# Patient Record
Sex: MALE | Race: ASIAN | NOT HISPANIC OR LATINO | Employment: OTHER | URBAN - METROPOLITAN AREA
[De-identification: names, ages, dates, MRNs, and addresses within clinical notes are randomized per-mention and may not be internally consistent; named-entity substitution may affect disease eponyms.]

---

## 2017-02-06 ENCOUNTER — AMBULATORY - HEALTHEAST (OUTPATIENT)
Dept: CARDIOLOGY | Facility: CLINIC | Age: 62
End: 2017-02-06

## 2017-02-06 ENCOUNTER — OFFICE VISIT - HEALTHEAST (OUTPATIENT)
Dept: CARDIOLOGY | Facility: CLINIC | Age: 62
End: 2017-02-06

## 2017-02-06 DIAGNOSIS — I10 ESSENTIAL HYPERTENSION: ICD-10-CM

## 2017-02-06 DIAGNOSIS — I25.10 CORONARY ARTERY DISEASE INVOLVING NATIVE CORONARY ARTERY OF NATIVE HEART WITHOUT ANGINA PECTORIS: ICD-10-CM

## 2017-02-06 DIAGNOSIS — E78.5 DYSLIPIDEMIA: ICD-10-CM

## 2017-02-06 ASSESSMENT — MIFFLIN-ST. JEOR: SCORE: 1405.42

## 2017-03-28 ENCOUNTER — COMMUNICATION - HEALTHEAST (OUTPATIENT)
Dept: CARDIOLOGY | Facility: CLINIC | Age: 62
End: 2017-03-28

## 2017-03-28 DIAGNOSIS — I25.10 CORONARY ATHEROSCLEROSIS: ICD-10-CM

## 2017-10-02 ENCOUNTER — COMMUNICATION - HEALTHEAST (OUTPATIENT)
Dept: CARDIOLOGY | Facility: CLINIC | Age: 62
End: 2017-10-02

## 2017-10-02 DIAGNOSIS — I10 HTN (HYPERTENSION): ICD-10-CM

## 2018-02-28 ENCOUNTER — COMMUNICATION - HEALTHEAST (OUTPATIENT)
Dept: CARDIOLOGY | Facility: CLINIC | Age: 63
End: 2018-02-28

## 2018-02-28 DIAGNOSIS — I10 HTN (HYPERTENSION): ICD-10-CM

## 2018-02-28 RX ORDER — METOPROLOL TARTRATE 50 MG
50 TABLET ORAL 2 TIMES DAILY
Qty: 180 TABLET | Refills: 0 | Status: SHIPPED | OUTPATIENT
Start: 2018-02-28

## 2018-04-06 ENCOUNTER — OFFICE VISIT - HEALTHEAST (OUTPATIENT)
Dept: CARDIOLOGY | Facility: CLINIC | Age: 63
End: 2018-04-06

## 2018-04-06 ENCOUNTER — COMMUNICATION - HEALTHEAST (OUTPATIENT)
Dept: CARDIOLOGY | Facility: CLINIC | Age: 63
End: 2018-04-06

## 2018-04-06 DIAGNOSIS — I10 HTN (HYPERTENSION): ICD-10-CM

## 2018-04-06 DIAGNOSIS — I10 ESSENTIAL HYPERTENSION: ICD-10-CM

## 2018-04-06 DIAGNOSIS — E78.5 DYSLIPIDEMIA: ICD-10-CM

## 2018-04-06 DIAGNOSIS — I25.10 CORONARY ATHEROSCLEROSIS: ICD-10-CM

## 2018-04-06 DIAGNOSIS — I25.10 CORONARY ARTERY DISEASE INVOLVING NATIVE CORONARY ARTERY OF NATIVE HEART WITHOUT ANGINA PECTORIS: ICD-10-CM

## 2018-04-06 RX ORDER — LISINOPRIL 40 MG/1
40 TABLET ORAL DAILY
Qty: 90 TABLET | Refills: 3 | Status: SHIPPED | OUTPATIENT
Start: 2018-04-06

## 2018-04-06 RX ORDER — METOPROLOL TARTRATE 50 MG
TABLET ORAL
Qty: 180 TABLET | Refills: 3 | Status: SHIPPED | OUTPATIENT
Start: 2018-04-06

## 2018-04-06 RX ORDER — NITROGLYCERIN 0.4 MG/1
0.4 TABLET SUBLINGUAL
Status: SHIPPED | COMMUNITY
Start: 2017-12-20

## 2018-04-06 ASSESSMENT — MIFFLIN-ST. JEOR: SCORE: 1419.03

## 2019-02-08 ENCOUNTER — COMMUNICATION - HEALTHEAST (OUTPATIENT)
Dept: ADMINISTRATIVE | Facility: CLINIC | Age: 64
End: 2019-02-08

## 2019-05-02 ENCOUNTER — OFFICE VISIT - HEALTHEAST (OUTPATIENT)
Dept: CARDIOLOGY | Facility: CLINIC | Age: 64
End: 2019-05-02

## 2019-05-02 DIAGNOSIS — I10 ESSENTIAL HYPERTENSION: ICD-10-CM

## 2019-05-02 DIAGNOSIS — E78.5 DYSLIPIDEMIA: ICD-10-CM

## 2019-05-02 DIAGNOSIS — I25.10 CORONARY ARTERY DISEASE INVOLVING NATIVE CORONARY ARTERY OF NATIVE HEART WITHOUT ANGINA PECTORIS: ICD-10-CM

## 2019-05-02 ASSESSMENT — MIFFLIN-ST. JEOR: SCORE: 1409.96

## 2021-05-30 VITALS — HEIGHT: 63 IN | WEIGHT: 161 LBS | BODY MASS INDEX: 28.53 KG/M2

## 2021-06-01 VITALS — WEIGHT: 164 LBS | BODY MASS INDEX: 29.06 KG/M2 | HEIGHT: 63 IN

## 2021-06-03 VITALS — WEIGHT: 162 LBS | BODY MASS INDEX: 28.7 KG/M2 | HEIGHT: 63 IN

## 2021-06-18 NOTE — LETTER
Letter by Lauro Torres MD at      Author: Lauro Torres MD Service: -- Author Type: --    Filed:  Encounter Date: 2/8/2019 Status: (Other)       Susie Mcconnell  [address] [place] [id number]      February 8, 2019      Dear Susie,    This letter is to remind you that you will be due for your follow up appointment with Dr. Juan M Torres . To help ensure you are in the best health possible, a regular follow-up with your cardiologist is essential.     Please call our Patient Scheduling Line at [phone] to schedule your appointment at your earliest convenience.  If you have recently scheduled an appointment, please disregard this letter.    We look forward to seeing you again. As always, we are available at the number  above for any questions or concerns you may have.      Sincerely,     The Physicians and Staff of [facility]

## 2021-06-25 NOTE — PROGRESS NOTES
Progress Notes by Lauro Torres MD at 2/6/2017 12:50 PM     Author: Lauro Torres MD Service: -- Author Type: Physician    Filed: 2/6/2017  1:15 PM Encounter Date: 2/6/2017 Status: Signed    : Lauro Torres MD (Physician)                  [facility]/Heart           Thank you Dr. Hamilton for asking the [facility] team to participate in the care of your patient, Susie Mcconnell.     Impression and Plan     1. Coronary artery disease. Rylie has known coronary artery disease. Specifically, patient had presented with an unstable ischemic syndrome early March 2015. Patient underwent angiography 4 March 2015 and was found to have significant disease involving the proximal and distal right coronary artery with both lesions treated successfully with Promus drug-eluting stents with a 3.5×16 mm Promise Premier drug-eluting stent placed to the proximal right coronary artery and a 2.75×12 mm Promise Premier drug-eluting stent placed to the more distal right coronary artery without complication.    This is stable since his revascularization procedure. He denies any recurrent chest discomfort.  At this time, I indicated to [facility] that he could discontinue the clopidogrel though emphasize the importance of continuing daily aspirin.     2. Hypertension. Blood pressure fairly reasonable my office today at 120/50 mmHg.     3. Dyslipidemia. Lipid profile within the last year on 12 September 2016 was favorable with LDL 74 mg/dL and HDL 40 mg/dL.     Follow-up in one year.            History of Present Illness    Once again I would like to thank you again for asking me to participate in the care of your patient, Susie Mcconnell.  As you know, but to reiterate for my own records, Susie Mcconnell is a 61 y.o. male with known coronary artery disease. Specifically, patient had presented with an unstable ischemic syndrome early March 2015. Patient underwent angiography 4 March 2015 and  "was found to have significant disease involving the proximal and distal right coronary artery with both lesions treated successfully with Promus drug-eluting stents with a 3.5×16 mm Promise Premier drug-eluting stent placed to the proximal right coronary artery and a 2.75×12 mm Promise Premier drug-eluting stent placed to the more distal right coronary artery without complication.     In follow-up today, Rylie is without complaint.  He specifically denies chest pain or shortness of breath.  He reports no subjective decline in exercise tolerance.  He reports no palpitations or lightheadedness.    Further review of systems is otherwise negative/noncontributory (medical record and 13 point review of systems reviewed as well and pertinent positives noted).         Cardiac Diagnostics      Coronary angiography 4 March 2015:  1. Left Main coronary artery: Minimal disease  2. LAD: Minimal disease  3. Left circumflex: Moderate 50% stenosis in the mid vessel.  4. Right coronary artery with severe 80% proximal right coronary artery stenosis and 90% distal right coronary artery stenosis.  5. Both right coronary artery lesions treated successfully with Promus drug-eluting stents with a 3.5×16 mm Promise Premier drug-eluting stent placed to the proximal right coronary artery and a 2.75×12 mm Promise Premier drug-eluting stent placed to the more distal right coronary artery without complication.     Echocardiogram 3 March 2015:  1. Normal left ventricular size and systolic performance with ejection fraction of 50-55%.  2. No regional wall motion abnormalities.  3. No significant valvular heart disease.          Physical Examination         Visit Vitals   ? /66 (Patient Site: Left Arm, Patient Position: Sitting, Cuff Size: Adult Regular)   ? Pulse 65   ? Resp 16   ? Ht 5' 3\" (1.6 m)   ? Wt 161 lb (73 kg)   ? SpO2 99%   ? BMI 28.52 kg/m2           Wt Readings from Last 3 Encounters:   02/06/17 161 lb (73 kg)   12/15/15 " 158 lb (71.7 kg)   06/18/15 164 lb (74.4 kg)       The patient is alert and oriented times three. Sclerae are anicteric. Mucosal membranes are moist. Jugular venous pressure is normal. No significant adenopathy/thyromegally appreciated. Lungs are clear with good expansion. On cardiovascular exam, the patient has a regular S1 and S2. Abdomen is soft and non-tender. Extremities reveal no clubbing, cyanosis, or edema.         Family History/Social History/Risk Factors   Patient does not smoke.  Family history of hypertension.         Medications  Allergies   Current Outpatient Prescriptions   Medication Sig Dispense Refill   ? aspirin 81 MG EC tablet Take 81 mg by mouth daily.     ? atorvastatin (LIPITOR) 80 MG tablet Take 1 tablet (80 mg total) by mouth bedtime. 90 tablet 2   ? calcium, as carbonate, (TUMS) 200 mg calcium (500 mg) chewable tablet Chew 1 tablet daily.     ? lisinopril (PRINIVIL,ZESTRIL) 40 MG tablet Take 1 tablet (40 mg total) by mouth daily. 90 tablet 3   ? metoprolol tartrate (LOPRESSOR) 50 MG tablet Take 1 tablet (50 mg total) by mouth 2 (two) times a day. 180 tablet 2   ? metoprolol tartrate (LOPRESSOR) 50 MG tablet TAKE ONE TABLET BY MOUTH TWICE DAILY 180 tablet 0   ? nitroglycerin (NITROSTAT) 0.4 MG SL tablet Place 0.4 mg under the tongue every 5 (five) minutes as needed for chest pain.     ? OMEGA-3/DHA/EPA/FISH OIL (FISH OIL-OMEGA-3 FATTY ACIDS) 300-1,000 mg capsule Take 2 g by mouth daily.       No current facility-administered medications for this visit.       Allergies   Allergen Reactions   ? Penicillins Rash          Lab Results   Lab Results   Component Value Date     03/06/2015    K 4.4 03/06/2015     (H) 03/06/2015    CO2 21 (L) 03/06/2015    BUN 15 03/06/2015    CREATININE 0.99 03/06/2015    CALCIUM 9.0 03/06/2015     Lab Results   Component Value Date    WBC 5.0 03/04/2015    WBC 5.1 03/03/2015    HGB 12.5 (L) 03/06/2015    HCT 37.1 (L) 03/04/2015    MCV 74 (L) 03/04/2015      03/04/2015     03/04/2015     Lab Results   Component Value Date    CHOL 121 07/29/2015    TRIG 51 07/29/2015    HDL 43 07/29/2015    LDLCALC 68 07/29/2015     Lab Results   Component Value Date    INR 1.05 03/02/2015     Lab Results   Component Value Date    CKTOTAL 149 03/02/2015    CKMB 3 03/02/2015    CKMB 2 03/02/2015    TROPONINI 0.75 () 03/03/2015    TROPONINI 1.06 () 03/03/2015    TROPONINI 0.46 () 03/02/2015

## 2021-06-26 NOTE — PROGRESS NOTES
Progress Notes by Lauro Torres MD at 4/6/2018 11:10 AM     Author: Lauro Torres MD Service: -- Author Type: Physician    Filed: 4/6/2018 11:26 AM Encounter Date: 4/6/2018 Status: Signed    : Lauro Torres MD (Physician)                  [facility]/Heart  [phone]         Thank you Dr. Hamilton for asking the [facility] team to participate in the care of your patient, Susie Mcconnell.     Impression and Plan     1. Coronary artery disease. Rylie has known coronary artery disease. Specifically, patient had presented with an unstable ischemic syndrome early March 2015. Patient underwent angiography 4 March 2015 and was found to have significant disease involving the proximal and distal right coronary artery with both lesions treated successfully with Promus drug-eluting stents with a 3.5×16 mm Promise Premier drug-eluting stent placed to the proximal right coronary artery and a 2.75×12 mm Promise Premier drug-eluting stent placed to the more distal right coronary artery without complication.     This is stable since his revascularization procedure. He denies any recurrent chest discomfort.     2. Hypertension. Blood pressure fairly reasonable my office today at 120/64 mmHg.     3. Dyslipidemia.  Recent lipid profile performed at [facility] 20 December 2017 was favorable with LDL 71 mg/dL and HDL 50 mg/dL.     Follow-up in one year.           History of Present Illness    Once again I would like to thank you again for asking me to participate in the care of your patient, Susie Mcconnell.  As you know, but to reiterate for my own records, Susie Mcconnell is a 62 y.o. male with known coronary artery disease. Specifically, patient had presented with an unstable ischemic syndrome early March 2015. Patient underwent angiography 4 March 2015 and was found to have significant disease involving the proximal and distal right coronary artery with both lesions treated  "successfully with Promus drug-eluting stents with a 3.5×16 mm Promise Premier drug-eluting stent placed to the proximal right coronary artery and a 2.75×12 mm Promise Premier drug-eluting stent placed to the more distal right coronary artery without complication.     In follow-up today, Rylie is without complaint.  He specifically denies chest pain or shortness of breath.  He reports no subjective decline in exercise tolerance.  He reports no palpitations or lightheadedness    Further review of systems is otherwise negative/noncontributory (medical record and 13 point review of systems reviewed as well and pertinent positives noted).         Cardiac Diagnostics      Coronary angiography 4 March 2015:  1. Left Main coronary artery: Minimal disease  2. LAD: Minimal disease  3. Left circumflex: Moderate 50% stenosis in the mid vessel.  4. Right coronary artery with severe 80% proximal right coronary artery stenosis and 90% distal right coronary artery stenosis.  5. Both right coronary artery lesions treated successfully with Promus drug-eluting stents with a 3.5×16 mm Promise Premier drug-eluting stent placed to the proximal right coronary artery and a 2.75×12 mm Promise Premier drug-eluting stent placed to the more distal right coronary artery without complication.    Echocardiogram 3 March 2015:  1. Normal left ventricular size and systolic performance with ejection fraction of 50-55%.  2. No regional wall motion abnormalities.  3. No significant valvular heart disease.         Physical Examination       /64  Pulse 60  Resp 16  Ht 5' 3\" (1.6 m)  Wt 164 lb (74.4 kg)  BMI 29.05 kg/m2        Wt Readings from Last 3 Encounters:   04/06/18 164 lb (74.4 kg)   02/06/17 161 lb (73 kg)   12/15/15 158 lb (71.7 kg)       The patient is alert and oriented times three. Sclerae are anicteric. Mucosal membranes are moist. Jugular venous pressure is normal. No significant adenopathy/thyromegally appreciated. Lungs are " clear with good expansion. On cardiovascular exam, the patient has a regular S1 and S2. Abdomen is soft and non-tender. Extremities reveal no clubbing, cyanosis, or edema.           Family History/Social History/Risk Factors   Patient does not smoke.  Family history of hypertension.          Medications  Allergies   Current Outpatient Prescriptions   Medication Sig Dispense Refill   ? nitroglycerin (NITROSTAT) 0.4 MG SL tablet Place 0.4 mg under the tongue.     ? aspirin 81 MG EC tablet Take 81 mg by mouth daily.     ? atorvastatin (LIPITOR) 80 MG tablet Take 1 tablet (80 mg total) by mouth bedtime. 90 tablet 2   ? calcium, as carbonate, (TUMS) 200 mg calcium (500 mg) chewable tablet Chew 1 tablet daily.     ? lisinopril (PRINIVIL,ZESTRIL) 40 MG tablet TAKE ONE TABLET BY MOUTH ONCE DAILY 90 tablet 2   ? metoprolol tartrate (LOPRESSOR) 50 MG tablet Take 1 tablet (50 mg total) by mouth 2 (two) times a day. 180 tablet 0   ? nitroglycerin (NITROSTAT) 0.4 MG SL tablet Place 0.4 mg under the tongue every 5 (five) minutes as needed for chest pain.     ? OMEGA-3/DHA/EPA/FISH OIL (FISH OIL-OMEGA-3 FATTY ACIDS) 300-1,000 mg capsule Take 2 g by mouth daily.       No current facility-administered medications for this visit.       Allergies   Allergen Reactions   ? Penicillins Rash          Lab Results   Lab Results   Component Value Date     03/06/2015    K 4.4 03/06/2015     (H) 03/06/2015    CO2 21 (L) 03/06/2015    BUN 15 03/06/2015    CREATININE 0.99 03/06/2015    CALCIUM 9.0 03/06/2015     Lab Results   Component Value Date    WBC 5.0 03/04/2015    WBC 5.1 03/03/2015    HGB 12.5 (L) 03/06/2015    HCT 37.1 (L) 03/04/2015    MCV 74 (L) 03/04/2015     03/04/2015     03/04/2015     Lab Results   Component Value Date    CHOL 121 07/29/2015    TRIG 51 07/29/2015    HDL 43 07/29/2015    LDLCALC 68 07/29/2015     Lab Results   Component Value Date    INR 1.05 03/02/2015     No results found for: BNP  Lab  Results   Component Value Date    CKTOTAL 149 03/02/2015    CKMB 3 03/02/2015    CKMB 2 03/02/2015    TROPONINI 0.75 () 03/03/2015    TROPONINI 1.06 () 03/03/2015    TROPONINI 0.46 () 03/02/2015

## 2021-06-27 NOTE — PROGRESS NOTES
Progress Notes by Lauro Torres MD at 5/2/2019  8:30 AM     Author: Lauro Torres MD Service: -- Author Type: Physician    Filed: 5/2/2019  8:45 AM Encounter Date: 5/2/2019 Status: Signed    : Lauro Torres MD (Physician)                  [facility]/Heart  [phone]         Thank you Dr. Hamilton for asking the [facility] Heart Care team to participate in the care of your patient, Susie Mcconnell.     Impression and Plan     1. Coronary artery disease. Rylie has known coronary artery disease. Specifically, patient had presented with an unstable ischemic syndrome early March 2015. Patient underwent angiography 4 March 2015 and was found to have significant disease involving the proximal and distal right coronary artery with both lesions treated successfully with Promus drug-eluting stents with a 3.5×16 mm Promise Premier drug-eluting stent placed to the proximal right coronary artery and a 2.75×12 mm Promise Premier drug-eluting stent placed to the more distal right coronary artery without complication.     This is stable since his revascularization procedure. He denies any recurrent chest discomfort.     2. Hypertension. Blood pressure is quite reasonable in the office today at 122/62 mmHg.     3. Dyslipidemia.  Lipid profile 18 April 2019 was favorable with LDL 63 mg/dL and HDL 47 mg/dL.     Follow-up in one year.         History of Present Illness    Once again I would like to thank you again for asking me to participate in the care of your patient, Susie Mcconnell.  As you know, but to reiterate for my own records, Susie Mcconnell is a 63 y.o. male with known coronary artery disease. Specifically, patient had presented with an unstable ischemic syndrome early March 2015. Patient underwent angiography 4 March 2015 and was found to have significant disease involving the proximal and distal right coronary artery with both lesions treated successfully with Promus  "drug-eluting stents with a 3.5×16 mm Promise Premier drug-eluting stent placed to the proximal right coronary artery and a 2.75×12 mm Promise Premier drug-eluting stent placed to the more distal right coronary artery without complication.     In follow-up today, Rylie is without complaint.  He specifically denies chest pain or shortness of breath.  He reports no subjective decline in exercise tolerance.  He reports no palpitations or lightheadedness.    Further review of systems is otherwise negative/noncontributory (medical record and 13 point review of systems reviewed as well and pertinent positives noted).         Cardiac Diagnostics      Coronary angiography 4 March 2015:  1. Left Main coronary artery: Minimal disease  2. LAD: Minimal disease  3. Left circumflex: Moderate 50% stenosis in the mid vessel.  4. Right coronary artery with severe 80% proximal right coronary artery stenosis and 90% distal right coronary artery stenosis.  5. Both right coronary artery lesions treated successfully with Promus drug-eluting stents with a 3.5×16 mm Promise Premier drug-eluting stent placed to the proximal right coronary artery and a 2.75×12 mm Promise Premier drug-eluting stent placed to the more distal right coronary artery without complication.    Echocardiogram 3 March 2015:  1. Normal left ventricular size and systolic performance with ejection fraction of 50-55%.  2. No regional wall motion abnormalities.  3. No significant valvular heart disease.           Physical Examination       /62 (Patient Site: Right Arm, Patient Position: Sitting, Cuff Size: Adult Large)   Pulse 68   Resp 16   Ht 5' 3\" (1.6 m)   Wt 162 lb (73.5 kg)   BMI 28.70 kg/m          Wt Readings from Last 3 Encounters:   05/02/19 162 lb (73.5 kg)   04/06/18 164 lb (74.4 kg)   02/06/17 161 lb (73 kg)     The patient is alert and oriented times three. Sclerae are anicteric. Mucosal membranes are moist. Jugular venous pressure is normal. No " significant adenopathy/thyromegally appreciated. Lungs are clear with good expansion. On cardiovascular exam, the patient has a regular S1 and S2. Abdomen is soft and non-tender. Extremities reveal no clubbing, cyanosis, or edema.         Family History/Social History/Risk Factors   Patient does not smoke.  Family history of hypertension.         Medications  Allergies   Current Outpatient Medications   Medication Sig Dispense Refill   ? aspirin 81 MG EC tablet Take 81 mg by mouth daily.     ? atorvastatin (LIPITOR) 80 MG tablet Take 1 tablet (80 mg total) by mouth bedtime. 90 tablet 2   ? lisinopril (PRINIVIL,ZESTRIL) 40 MG tablet Take 1 tablet (40 mg total) by mouth daily. 90 tablet 3   ? metoprolol tartrate (LOPRESSOR) 50 MG tablet Take 1 tablet (50 mg total) by mouth 2 (two) times a day. 180 tablet 0   ? metoprolol tartrate (LOPRESSOR) 50 MG tablet TAKE ONE TABLET BY MOUTH TWICE DAILY 180 tablet 3   ? nitroglycerin (NITROSTAT) 0.4 MG SL tablet Place 0.4 mg under the tongue every 5 (five) minutes as needed for chest pain.     ? nitroglycerin (NITROSTAT) 0.4 MG SL tablet Place 0.4 mg under the tongue.     ? calcium, as carbonate, (TUMS) 200 mg calcium (500 mg) chewable tablet Chew 1 tablet daily.     ? OMEGA-3/DHA/EPA/FISH OIL (FISH OIL-OMEGA-3 FATTY ACIDS) 300-1,000 mg capsule Take 2 g by mouth daily.       No current facility-administered medications for this visit.       Allergies   Allergen Reactions   ? Penicillins Rash          Lab Results   Lab Results   Component Value Date     03/06/2015    K 4.4 03/06/2015     (H) 03/06/2015    CO2 21 (L) 03/06/2015    BUN 15 03/06/2015    CREATININE 0.99 03/06/2015    CALCIUM 9.0 03/06/2015     Lab Results   Component Value Date    WBC 5.0 03/04/2015    WBC 5.1 03/03/2015    HGB 12.5 (L) 03/06/2015    HCT 37.1 (L) 03/04/2015    MCV 74 (L) 03/04/2015     03/04/2015     03/04/2015     Lab Results   Component Value Date    CHOL 121 07/29/2015     TRIG 51 07/29/2015    HDL 43 07/29/2015    LDLCALC 68 07/29/2015     Lab Results   Component Value Date    INR 1.05 03/02/2015     Lab Results   Component Value Date    CKTOTAL 149 03/02/2015    CKMB 3 03/02/2015    CKMB 2 03/02/2015    TROPONINI 0.75 () 03/03/2015    TROPONINI 1.06 () 03/03/2015    TROPONINI 0.46 () 03/02/2015

## 2021-07-30 ENCOUNTER — HOSPITAL ENCOUNTER (OUTPATIENT)
Dept: ULTRASOUND IMAGING | Facility: HOSPITAL | Age: 66
Discharge: HOME | End: 2021-07-30
Attending: INTERNAL MEDICINE | Admitting: INTERNAL MEDICINE
Payer: COMMERCIAL

## 2021-07-30 DIAGNOSIS — N18.30 CHRONIC KIDNEY DISEASE, STAGE III (MODERATE) (H): ICD-10-CM

## 2021-07-30 PROCEDURE — 76770 US EXAM ABDO BACK WALL COMP: CPT

## 2021-10-14 ENCOUNTER — OFFICE VISIT (OUTPATIENT)
Dept: CARDIOLOGY | Facility: CLINIC | Age: 66
End: 2021-10-14
Payer: COMMERCIAL

## 2021-10-14 VITALS
SYSTOLIC BLOOD PRESSURE: 128 MMHG | DIASTOLIC BLOOD PRESSURE: 72 MMHG | RESPIRATION RATE: 16 BRPM | HEART RATE: 74 BPM | WEIGHT: 160 LBS | BODY MASS INDEX: 28.34 KG/M2

## 2021-10-14 DIAGNOSIS — I10 HYPERTENSION, UNSPECIFIED TYPE: ICD-10-CM

## 2021-10-14 DIAGNOSIS — E78.5 DYSLIPIDEMIA: ICD-10-CM

## 2021-10-14 DIAGNOSIS — I25.10 CORONARY ARTERY DISEASE INVOLVING NATIVE CORONARY ARTERY WITHOUT ANGINA PECTORIS, UNSPECIFIED WHETHER NATIVE OR TRANSPLANTED HEART: Primary | ICD-10-CM

## 2021-10-14 PROCEDURE — 99214 OFFICE O/P EST MOD 30 MIN: CPT | Performed by: INTERNAL MEDICINE

## 2021-10-14 RX ORDER — AMLODIPINE BESYLATE 10 MG/1
10 TABLET ORAL DAILY
COMMUNITY

## 2021-10-14 NOTE — LETTER
10/14/2021    Carmelita Hamilton MD  [address]  [facility] [address]    RE: Darren Mcconnell       Dear Colleague,    I had the pleasure of seeing Darren Mcconnell in the [facility].           [facility]   [address], [place] [address]   [email or web address]   OFFICE: [phone]          Thank you Carmelita Whalen for asking the [facility] team to participate in the care of your patient, Darren Mcconnell.     Impression and Plan     1. Coronary artery disease. Rylie has known coronary artery disease. Specifically, patient had presented with an unstable ischemic syndrome early March 2015. Kecia underwent angiography 4 March 2015 and was found to have significant disease involving the proximal and distal right coronary artery with both lesions treated successfully with Promus drug-eluting stents with a 3.5×16 mm Promise Premier drug-eluting stent placed to the proximal right coronary artery and a 2.75×12 mm Promise Premier drug-eluting stent placed to the more distal right coronary artery without complication.     This is stable since his revascularization procedure. He denies any recurrent chest discomfort.     2. Hypertension. Blood pressure is quite reasonable in the office today at 128/72 mmHg.     3. Dyslipidemia.  Lipid profile 25 May 2021 revealed LDL 82 mg/dL and HDL 42 mg/dL.    Continue high intensity statin therapy (atorvastatin 80 mg daily).    Follow-up in one year.       30 minutes spent reviewing prior records (including documentation, laboratory studies, cardiac testing/imaging), interview with patient along with physical exam, planning, and subsequent documentation/crafting of note.           History of Present Illness    Once again I would like to thank you again for asking me to participate in the care of your patient, Darren Mcconnell.  As you know, but to reiterate for my  own records, Darren Mcconnell is a 65 year old male with known coronary artery disease. Specifically, Susie had presented with an unstable ischemic syndrome early March 2015. Susie underwent angiography 4 March 2015 and was found to have significant disease involving the proximal and distal right coronary artery with both lesions treated successfully with Promus drug-eluting stents with a 3.5×16 mm Promise Premier drug-eluting stent placed to the proximal right coronary artery and a 2.75×12 mm Promise Premier drug-eluting stent placed to the more distal right coronary artery without complication.     In follow-up today, Rylie is without complaint.  He specifically denies chest pain or shortness of breath.  He reports no subjective decline in exercise tolerance.  He reports no palpitations or lightheadedness.  No fevers, chills, or other constitutional symptoms.    Further review of systems is otherwise negative/noncontributory (medical record and 13 point review of systems reviewed as well and pertinent positives noted).         Cardiac Diagnostics      Coronary angiography 4 March 2015:  1. Left Main coronary artery: Minimal disease  2. LAD: Minimal disease  3. Left circumflex: Moderate 50% stenosis in the mid vessel.  4. Right coronary artery with severe 80% proximal right coronary artery stenosis and 90% distal right coronary artery stenosis.  5. Both right coronary artery lesions treated successfully with Promus drug-eluting stents with a 3.5×16 mm Promise Premier drug-eluting stent placed to the proximal right coronary artery and a 2.75×12 mm Promise Premier drug-eluting stent placed to the more distal right coronary artery without complication.    Echocardiogram 3 March 2015:  1. Normal left ventricular size and systolic performance with ejection fraction of 50-55%.  2. No regional wall motion abnormalities.  3. No significant valvular heart disease.             Physical Examination       /72  (BP Location: Left arm, Patient Position: Sitting, Cuff Size: Adult Regular)   Pulse 74   Resp 16   Wt 72.6 kg (160 lb)   BMI 28.34 kg/m          Wt Readings from Last 3 Encounters:   10/14/21 72.6 kg (160 lb)   05/02/19 73.5 kg (162 lb)   04/06/18 74.4 kg (164 lb)     The patient is alert and oriented times three. Sclerae are anicteric. Mucosal membranes are moist. Jugular venous pressure is normal. No significant adenopathy/thyromegally appreciated. Lungs are clear with good expansion. On cardiovascular exam, the patient has a regular S1 and S2. Abdomen is soft and non-tender. Extremities reveal no clubbing, cyanosis, or edema.       Medications  Allergies   Current Outpatient Medications   Medication Sig Dispense Refill     amLODIPine (NORVASC) 10 MG tablet Take 10 mg by mouth daily       atorvastatin (LIPITOR) 80 MG tablet [ATORVASTATIN (LIPITOR) 80 MG TABLET] Take 1 tablet (80 mg total) by mouth bedtime. 90 tablet 2     lisinopril (PRINIVIL,ZESTRIL) 40 MG tablet [LISINOPRIL (PRINIVIL,ZESTRIL) 40 MG TABLET] Take 1 tablet (40 mg total) by mouth daily. 90 tablet 3     nitroglycerin (NITROSTAT) 0.4 MG SL tablet [NITROGLYCERIN (NITROSTAT) 0.4 MG SL TABLET] Place 0.4 mg under the tongue every 5 (five) minutes as needed for chest pain.       OMEGA-3/DHA/EPA/FISH OIL (FISH OIL-OMEGA-3 FATTY ACIDS) 300-1,000 mg capsule [OMEGA-3/DHA/EPA/FISH OIL (FISH OIL-OMEGA-3 FATTY ACIDS) 300-1,000 MG CAPSULE] Take 2 g by mouth daily.       aspirin 81 MG EC tablet [ASPIRIN 81 MG EC TABLET] Take 81 mg by mouth daily. (Patient not taking: Reported on 10/14/2021)       calcium, as carbonate, (TUMS) 200 mg calcium (500 mg) chewable tablet [CALCIUM, AS CARBONATE, (TUMS) 200 MG CALCIUM (500 MG) CHEWABLE TABLET] Chew 1 tablet daily. (Patient not taking: Reported on 10/14/2021)       metoprolol tartrate (LOPRESSOR) 50 MG tablet [METOPROLOL TARTRATE (LOPRESSOR) 50 MG TABLET] TAKE ONE TABLET BY MOUTH TWICE DAILY (Patient not taking:  Reported on 10/14/2021) 180 tablet 3     metoprolol tartrate (LOPRESSOR) 50 MG tablet [METOPROLOL TARTRATE (LOPRESSOR) 50 MG TABLET] Take 1 tablet (50 mg total) by mouth 2 (two) times a day. (Patient not taking: Reported on 10/14/2021) 180 tablet 0     nitroglycerin (NITROSTAT) 0.4 MG SL tablet [NITROGLYCERIN (NITROSTAT) 0.4 MG SL TABLET] Place 0.4 mg under the tongue. (Patient not taking: Reported on 10/14/2021)         Allergies   Allergen Reactions     Penicillins Rash          Lab Results    Chemistry/lipid CBC Cardiac Enzymes/BNP/TSH/INR   Recent Labs   Lab Test 07/29/15  0827   CHOL 121   HDL 43   LDL 68   TRIG 51     Recent Labs   Lab Test 07/29/15  0827 03/03/15  0406   LDL 68 147*     No results for input(s): NA, POTASSIUM, CHLORIDE, CO2, GLC, BUN, CR, GFRESTIMATED, COLLEEN in the last 57689 hours.    Invalid input(s): GRFESTBLACK  No results for input(s): CR in the last 08300 hours.  No results for input(s): A1C in the last 19600 hours.       No results for input(s): WBC, HGB, HCT, MCV, PLT in the last 43726 hours.  No results for input(s): HGB in the last 38512 hours. No results for input(s): TROPONINI in the last 82703 hours.  No results for input(s): BNP, NTBNPI, NTBNP in the last 00958 hours.  No results for input(s): TSH in the last 86144 hours.  No results for input(s): INR in the last 47713 hours.     Medical History  Surgical History Family History Social History   No past medical history on file.  Past Surgical History:   Procedure Laterality Date     GASTRECTOMY       Family History   Problem Relation Age of Onset     Hypertension Father         Social History     Socioeconomic History     Marital status:      Spouse name: Not on file     Number of children: Not on file     Years of education: Not on file     Highest education level: Not on file   Occupational History     Not on file   Tobacco Use     Smoking status: Former Smoker     Smokeless tobacco: Never Used   Substance and Sexual  Activity     Alcohol use: No     Drug use: Not on file     Sexual activity: Not on file   Other Topics Concern     Not on file   Social History Narrative     Not on file     Social Determinants of Health     Financial Resource Strain:      Difficulty of Paying Living Expenses:    Food Insecurity:      Worried About Running Out of Food in the Last Year:      Ran Out of Food in the Last Year:    Transportation Needs:      Lack of Transportation (Medical):      Lack of Transportation (Non-Medical):    Physical Activity:      Days of Exercise per Week:      Minutes of Exercise per Session:    Stress:      Feeling of Stress :    Social Connections:      Frequency of Communication with Friends and Family:      Frequency of Social Gatherings with Friends and Family:      Attends Shinto Services:      Active Member of Clubs or Organizations:      Attends Club or Organization Meetings:      Marital Status:    Intimate Partner Violence:      Fear of Current or Ex-Partner:      Emotionally Abused:      Physically Abused:      Sexually Abused:                       Thank you for allowing me to participate in the care of your patient.      Sincerely,     Lauro Torres MD     [facility]  cc:   No referring provider defined for this encounter.

## 2024-01-06 ENCOUNTER — APPOINTMENT (OUTPATIENT)
Dept: MRI IMAGING | Facility: CLINIC | Age: 69
End: 2024-01-06
Attending: STUDENT IN AN ORGANIZED HEALTH CARE EDUCATION/TRAINING PROGRAM
Payer: MEDICARE

## 2024-01-06 ENCOUNTER — HOSPITAL ENCOUNTER (EMERGENCY)
Facility: CLINIC | Age: 69
Discharge: HOME | End: 2024-01-06
Attending: STUDENT IN AN ORGANIZED HEALTH CARE EDUCATION/TRAINING PROGRAM | Admitting: STUDENT IN AN ORGANIZED HEALTH CARE EDUCATION/TRAINING PROGRAM
Payer: MEDICARE

## 2024-01-06 VITALS
DIASTOLIC BLOOD PRESSURE: 64 MMHG | BODY MASS INDEX: 27.49 KG/M2 | RESPIRATION RATE: 19 BRPM | SYSTOLIC BLOOD PRESSURE: 141 MMHG | TEMPERATURE: 97.9 F | OXYGEN SATURATION: 98 % | WEIGHT: 161 LBS | HEART RATE: 78 BPM | HEIGHT: 64 IN

## 2024-01-06 DIAGNOSIS — R11.0 NAUSEA: ICD-10-CM

## 2024-01-06 DIAGNOSIS — I65.22 CAROTID STENOSIS, LEFT: ICD-10-CM

## 2024-01-06 DIAGNOSIS — R42 VERTIGO: ICD-10-CM

## 2024-01-06 LAB
ALBUMIN SERPL BCG-MCNC: 4.3 G/DL (ref 3.5–5.2)
ALP SERPL-CCNC: 101 U/L (ref 40–150)
ALT SERPL W P-5'-P-CCNC: 24 U/L (ref 0–70)
ANION GAP SERPL CALCULATED.3IONS-SCNC: 9 MMOL/L (ref 7–15)
AST SERPL W P-5'-P-CCNC: 35 U/L (ref 0–45)
ATRIAL RATE - MUSE: 67 BPM
BASOPHILS # BLD AUTO: 0 10E3/UL (ref 0–0.2)
BASOPHILS NFR BLD AUTO: 0 %
BILIRUB SERPL-MCNC: 0.5 MG/DL
BUN SERPL-MCNC: 18.6 MG/DL (ref 8–23)
CALCIUM SERPL-MCNC: 8.9 MG/DL (ref 8.8–10.2)
CHLORIDE SERPL-SCNC: 105 MMOL/L (ref 98–107)
CREAT SERPL-MCNC: 1.16 MG/DL (ref 0.67–1.17)
DEPRECATED HCO3 PLAS-SCNC: 24 MMOL/L (ref 22–29)
DIASTOLIC BLOOD PRESSURE - MUSE: NORMAL MMHG
EGFRCR SERPLBLD CKD-EPI 2021: 68 ML/MIN/1.73M2
EOSINOPHIL # BLD AUTO: 0.1 10E3/UL (ref 0–0.7)
EOSINOPHIL NFR BLD AUTO: 1 %
ERYTHROCYTE [DISTWIDTH] IN BLOOD BY AUTOMATED COUNT: 13.2 % (ref 10–15)
GLUCOSE SERPL-MCNC: 121 MG/DL (ref 70–99)
HCT VFR BLD AUTO: 40.5 % (ref 40–53)
HGB BLD-MCNC: 13.1 G/DL (ref 13.3–17.7)
HOLD SPECIMEN: NORMAL
HOLD SPECIMEN: NORMAL
IMM GRANULOCYTES # BLD: 0 10E3/UL
IMM GRANULOCYTES NFR BLD: 0 %
INR PPP: 1.02 (ref 0.85–1.15)
INTERPRETATION ECG - MUSE: NORMAL
LYMPHOCYTES # BLD AUTO: 1 10E3/UL (ref 0.8–5.3)
LYMPHOCYTES NFR BLD AUTO: 22 %
MCH RBC QN AUTO: 27.8 PG (ref 26.5–33)
MCHC RBC AUTO-ENTMCNC: 32.3 G/DL (ref 31.5–36.5)
MCV RBC AUTO: 86 FL (ref 78–100)
MONOCYTES # BLD AUTO: 0.3 10E3/UL (ref 0–1.3)
MONOCYTES NFR BLD AUTO: 6 %
NEUTROPHILS # BLD AUTO: 3.4 10E3/UL (ref 1.6–8.3)
NEUTROPHILS NFR BLD AUTO: 71 %
NRBC # BLD AUTO: 0 10E3/UL
NRBC BLD AUTO-RTO: 0 /100
P AXIS - MUSE: 66 DEGREES
PLATELET # BLD AUTO: 144 10E3/UL (ref 150–450)
POTASSIUM SERPL-SCNC: 4.3 MMOL/L (ref 3.4–5.3)
PR INTERVAL - MUSE: 202 MS
PROT SERPL-MCNC: 7.1 G/DL (ref 6.4–8.3)
QRS DURATION - MUSE: 96 MS
QT - MUSE: 414 MS
QTC - MUSE: 437 MS
R AXIS - MUSE: 54 DEGREES
RBC # BLD AUTO: 4.71 10E6/UL (ref 4.4–5.9)
SODIUM SERPL-SCNC: 138 MMOL/L (ref 135–145)
SYSTOLIC BLOOD PRESSURE - MUSE: NORMAL MMHG
T AXIS - MUSE: 63 DEGREES
TROPONIN T SERPL HS-MCNC: 6 NG/L
TROPONIN T SERPL HS-MCNC: 7 NG/L
VENTRICULAR RATE- MUSE: 67 BPM
WBC # BLD AUTO: 4.8 10E3/UL (ref 4–11)

## 2024-01-06 PROCEDURE — 85610 PROTHROMBIN TIME: CPT | Performed by: STUDENT IN AN ORGANIZED HEALTH CARE EDUCATION/TRAINING PROGRAM

## 2024-01-06 PROCEDURE — A9585 GADOBUTROL INJECTION: HCPCS | Performed by: STUDENT IN AN ORGANIZED HEALTH CARE EDUCATION/TRAINING PROGRAM

## 2024-01-06 PROCEDURE — 80053 COMPREHEN METABOLIC PANEL: CPT | Performed by: STUDENT IN AN ORGANIZED HEALTH CARE EDUCATION/TRAINING PROGRAM

## 2024-01-06 PROCEDURE — 255N000002 HC RX 255 OP 636: Performed by: STUDENT IN AN ORGANIZED HEALTH CARE EDUCATION/TRAINING PROGRAM

## 2024-01-06 PROCEDURE — 96361 HYDRATE IV INFUSION ADD-ON: CPT

## 2024-01-06 PROCEDURE — 250N000013 HC RX MED GY IP 250 OP 250 PS 637: Performed by: STUDENT IN AN ORGANIZED HEALTH CARE EDUCATION/TRAINING PROGRAM

## 2024-01-06 PROCEDURE — 85025 COMPLETE CBC W/AUTO DIFF WBC: CPT | Performed by: STUDENT IN AN ORGANIZED HEALTH CARE EDUCATION/TRAINING PROGRAM

## 2024-01-06 PROCEDURE — 36415 COLL VENOUS BLD VENIPUNCTURE: CPT | Performed by: STUDENT IN AN ORGANIZED HEALTH CARE EDUCATION/TRAINING PROGRAM

## 2024-01-06 PROCEDURE — 93005 ELECTROCARDIOGRAM TRACING: CPT | Performed by: STUDENT IN AN ORGANIZED HEALTH CARE EDUCATION/TRAINING PROGRAM

## 2024-01-06 PROCEDURE — 70553 MRI BRAIN STEM W/O & W/DYE: CPT | Mod: MG

## 2024-01-06 PROCEDURE — G1010 CDSM STANSON: HCPCS

## 2024-01-06 PROCEDURE — 70549 MR ANGIOGRAPH NECK W/O&W/DYE: CPT | Mod: MG

## 2024-01-06 PROCEDURE — 258N000003 HC RX IP 258 OP 636: Performed by: STUDENT IN AN ORGANIZED HEALTH CARE EDUCATION/TRAINING PROGRAM

## 2024-01-06 PROCEDURE — 99448 NTRPROF PH1/NTRNET/EHR 21-30: CPT | Performed by: PSYCHIATRY & NEUROLOGY

## 2024-01-06 PROCEDURE — 250N000011 HC RX IP 250 OP 636: Performed by: STUDENT IN AN ORGANIZED HEALTH CARE EDUCATION/TRAINING PROGRAM

## 2024-01-06 PROCEDURE — 99285 EMERGENCY DEPT VISIT HI MDM: CPT | Mod: 25

## 2024-01-06 PROCEDURE — 96374 THER/PROPH/DIAG INJ IV PUSH: CPT | Mod: 59

## 2024-01-06 PROCEDURE — 84484 ASSAY OF TROPONIN QUANT: CPT | Performed by: STUDENT IN AN ORGANIZED HEALTH CARE EDUCATION/TRAINING PROGRAM

## 2024-01-06 RX ORDER — GADOBUTROL 604.72 MG/ML
10 INJECTION INTRAVENOUS ONCE
Status: COMPLETED | OUTPATIENT
Start: 2024-01-06 | End: 2024-01-06

## 2024-01-06 RX ORDER — MECLIZINE HYDROCHLORIDE 25 MG/1
25 TABLET ORAL ONCE
Status: COMPLETED | OUTPATIENT
Start: 2024-01-06 | End: 2024-01-06

## 2024-01-06 RX ORDER — MECLIZINE HYDROCHLORIDE 25 MG/1
25 TABLET ORAL 3 TIMES DAILY PRN
Qty: 30 TABLET | Refills: 0 | Status: SHIPPED | OUTPATIENT
Start: 2024-01-06

## 2024-01-06 RX ORDER — LORAZEPAM 2 MG/ML
1 INJECTION INTRAMUSCULAR ONCE
Status: COMPLETED | OUTPATIENT
Start: 2024-01-06 | End: 2024-01-06

## 2024-01-06 RX ADMIN — SODIUM CHLORIDE 1000 ML: 9 INJECTION, SOLUTION INTRAVENOUS at 18:27

## 2024-01-06 RX ADMIN — GADOBUTROL 10 ML: 604.72 INJECTION INTRAVENOUS at 19:23

## 2024-01-06 RX ADMIN — MECLIZINE HYDROCHLORIDE 25 MG: 25 TABLET ORAL at 18:08

## 2024-01-06 RX ADMIN — LORAZEPAM 1 MG: 2 INJECTION INTRAMUSCULAR; INTRAVENOUS at 20:24

## 2024-01-06 ASSESSMENT — ACTIVITIES OF DAILY LIVING (ADL)
ADLS_ACUITY_SCORE: 35
ADLS_ACUITY_SCORE: 35
ADLS_ACUITY_SCORE: 33

## 2024-01-06 NOTE — ED TRIAGE NOTES
A little after 1100, pt was cutting vegetables, getting ready for lunch. Began to have severe dizziness with nausea and diaphoresis. Pt feels like symptoms are worse when he opens his eyes.     Triage Assessment (Adult)       Row Name 01/06/24 5258          Triage Assessment    Airway WDL WDL        Respiratory WDL    Respiratory WDL WDL        Skin Circulation/Temperature WDL    Skin Circulation/Temperature WDL WDL        Cardiac WDL    Cardiac WDL WDL        Peripheral/Neurovascular WDL    Peripheral Neurovascular WDL WDL        Cognitive/Neuro/Behavioral WDL    Cognitive/Neuro/Behavioral WDL WDL

## 2024-01-06 NOTE — ED PROVIDER NOTES
NAME: Darren Mcconnell  AGE: 68 year old male  YOB: 1955  MRN: [id number]  EVALUATION DATE & TIME: 2024  4:58 PM    PCP: Carmelita Hamilton  ED PROVIDER: Xenia Dinero MD.    Chief Complaint   Patient presents with    Dizziness    Nausea       FINAL IMPRESSION:  1. Vertigo    2. Nausea    3. Carotid stenosis, left        MEDICAL DECISION MAKIN:06 PM I met with the patient, obtained history, performed an initial exam, and discussed options and plan for diagnostics and treatment here in the ED.   ED Course as of 24 2226   Sat  Rechecked patient    Patient up walking with steady gait    Discussed with Dr. Paris stroke neurology    Updated patient     MR Brain w/o & w Contrast     MDM: 67 y/o M who presents with dizziness. Sudden onset today, worse with opening his eyes and turning his head.  Differential includes but not limited to peripheral vertigo, stroke, intracranial hemorrhage, mass, metabolic abnormality, atypical ACS, among others.  He is nontoxic-appearing with no focal neurologic deficits.  Given no headache or trauma did not start with CT head.. Labs generally reassuring. EKG showed sinus rhythm with subtle ST change to anterior leads, troponin reassuring x 3, no chest pain, lower suspicion for ACS. No abodminal tenderness on exam. He was given fluids, meclizine, ativan and had improvement of symptoms.  He was able to ambulate safely here. MRI/MRA without stroke, does show 70% stenosis of the left ICA, high grade stenosis of the left external carotid artery, possible artifact vs fibromuscular dysplasias of vertebral arteries, possible artifact vs right ICA aneurysm.  Discussed the MRI findings with neurology.  Patient is already on an aspirin and a statin.  They agreed reasonable to discharge.  They did recommend outpatient vascular surgery consult and a referral was placed.  Patient and his family are in agreement with the plan and their  questions were answered.  Strict return precautions given      Medical Decision Making    History:  Supplemental history from: Documented in chart  External Record(s) reviewed: Documented in chart    Work Up:  Chart documentation includes differential considered and any EKGs or imaging interpreted by provider.  In additional to work up documented, I considered the following work up: Documented in chart, if applicable.    External consultation:  Discussion of management with another provider: Documented in chart, if applicable    Complicating factors:  Care impacted by chronic illness: Heart Disease, Hyperlipidemia, and Hypertension  Care affected by social determinants of health: Access to Medical Care    Disposition considerations: Discharge. No recommendations on prescription strength medication(s). I considered admission, but discharged patient after significant clinical improvement.    MEDICATIONS GIVEN IN THE EMERGENCY:  Medications   meclizine (ANTIVERT) tablet 25 mg (25 mg Oral $Given 1/6/24 1808)   sodium chloride 0.9% BOLUS 1,000 mL (0 mLs Intravenous Stopped 1/6/24 2158)   gadobutrol (GADAVIST) injection 10 mL (10 mLs Intravenous $Given 1/6/24 1923)   LORazepam (ATIVAN) injection 1 mg (1 mg Intravenous $Given 1/6/24 2024)       NEW PRESCRIPTIONS STARTED AT TODAY'S ER VISIT:  Discharge Medication List as of 1/6/2024  9:59 PM        START taking these medications    Details   meclizine (ANTIVERT) 25 MG tablet Take 1 tablet (25 mg) by mouth 3 times daily as needed for dizziness or nausea, Disp-30 tablet, R-0, Local Print              =================================================================  HPI    Patient information was obtained from: patient and family member  Use of : Yes ( Phone) - Language Mandarin      Darren Mcconnell is a 68 year old male with a past medical history of hypertension, coronary artery disease, NSTEMI, and peptic ulcer disease, who presents dizziness and  nausea.    Patient reports he was cutting vegetables just after 11 AM when he started having dizziness, nausea, and diaphoresis. He feels like his symptoms worsen if he opens his eyes.  He reports it does somewhat feel like a spinning sensation.  This is also associated with some diaphoresis.  Symptoms seem worse when he turns his head. Associated with nausea and vomiting. No headache, fever, chest pain, abdominal pain, numbness, focal weakness.  Has never had this happen before.    PAST MEDICAL HISTORY:  No past medical history on file.    PAST SURGICAL HISTORY:  Past Surgical History:   Procedure Laterality Date    GASTRECTOMY         CURRENT MEDICATIONS:    No current facility-administered medications for this encounter.    Current Outpatient Medications:     meclizine (ANTIVERT) 25 MG tablet, Take 1 tablet (25 mg) by mouth 3 times daily as needed for dizziness or nausea, Disp: 30 tablet, Rfl: 0    amLODIPine (NORVASC) 10 MG tablet, Take 10 mg by mouth daily, Disp: , Rfl:     aspirin 81 MG EC tablet, [ASPIRIN 81 MG EC TABLET] Take 81 mg by mouth daily. (Patient not taking: Reported on 10/14/2021), Disp: , Rfl:     atorvastatin (LIPITOR) 80 MG tablet, [ATORVASTATIN (LIPITOR) 80 MG TABLET] Take 1 tablet (80 mg total) by mouth bedtime., Disp: 90 tablet, Rfl: 2    calcium, as carbonate, (TUMS) 200 mg calcium (500 mg) chewable tablet, [CALCIUM, AS CARBONATE, (TUMS) 200 MG CALCIUM (500 MG) CHEWABLE TABLET] Chew 1 tablet daily. (Patient not taking: Reported on 10/14/2021), Disp: , Rfl:     lisinopril (PRINIVIL,ZESTRIL) 40 MG tablet, [LISINOPRIL (PRINIVIL,ZESTRIL) 40 MG TABLET] Take 1 tablet (40 mg total) by mouth daily., Disp: 90 tablet, Rfl: 3    metoprolol tartrate (LOPRESSOR) 50 MG tablet, [METOPROLOL TARTRATE (LOPRESSOR) 50 MG TABLET] TAKE ONE TABLET BY MOUTH TWICE DAILY (Patient not taking: Reported on 10/14/2021), Disp: 180 tablet, Rfl: 3    metoprolol tartrate (LOPRESSOR) 50 MG tablet, [METOPROLOL TARTRATE  "(LOPRESSOR) 50 MG TABLET] Take 1 tablet (50 mg total) by mouth 2 (two) times a day. (Patient not taking: Reported on 10/14/2021), Disp: 180 tablet, Rfl: 0    nitroglycerin (NITROSTAT) 0.4 MG SL tablet, [NITROGLYCERIN (NITROSTAT) 0.4 MG SL TABLET] Place 0.4 mg under the tongue. (Patient not taking: Reported on 10/14/2021), Disp: , Rfl:     nitroglycerin (NITROSTAT) 0.4 MG SL tablet, [NITROGLYCERIN (NITROSTAT) 0.4 MG SL TABLET] Place 0.4 mg under the tongue every 5 (five) minutes as needed for chest pain., Disp: , Rfl:     OMEGA-3/DHA/EPA/FISH OIL (FISH OIL-OMEGA-3 FATTY ACIDS) 300-1,000 mg capsule, [OMEGA-3/DHA/EPA/FISH OIL (FISH OIL-OMEGA-3 FATTY ACIDS) 300-1,000 MG CAPSULE] Take 2 g by mouth daily., Disp: , Rfl:     ALLERGIES:  Allergies   Allergen Reactions    Penicillins Rash       FAMILY HISTORY:  Family History   Problem Relation Age of Onset    Hypertension Father        SOCIAL HISTORY:   Social History     Socioeconomic History    Marital status:    Tobacco Use    Smoking status: Former    Smokeless tobacco: Never   Substance and Sexual Activity    Alcohol use: No       PHYSICAL EXAM:    Vitals: BP (!) 141/64   Pulse 78   Temp 97.9  F (36.6  C)   Resp 19   Ht 1.63 m (5' 4.17\")   Wt 73 kg (161 lb)   SpO2 98%   BMI 27.49 kg/m     Constitutional: Well developed, well nourished. Lying in bed with eyes closed  HENT: Normocephalic, atraumatic. Neck-gross ROM intact.   Eyes: Pupils mid-range and equal, EOMI, sclera white  Respiratory: CTAB, no respiratory distress  Cardiovascular: Normal heart rate, regular rhythm. No lower extremity edema   GI: Soft, not distended, non tender, no guarding  Musculoskeletal: Moving extremities intentionally and without pain. No obvious deformity.  Skin: Warm, dry, no rash.  Neuro: Alert and oriented, CN II-XII grossly intact, speech clear. 5/5 strength in upper and lower extremities. Sensation to light touch intact in all 4 extremities. No pronator drift. No nystagmus " noted      LAB:  All pertinent labs reviewed and interpreted.  Labs Ordered and Resulted from Time of ED Arrival to Time of ED Departure   COMPREHENSIVE METABOLIC PANEL - Abnormal       Result Value    Sodium 138      Potassium 4.3      Carbon Dioxide (CO2) 24      Anion Gap 9      Urea Nitrogen 18.6      Creatinine 1.16      GFR Estimate 68      Calcium 8.9      Chloride 105      Glucose 121 (*)     Alkaline Phosphatase 101      AST 35      ALT 24      Protein Total 7.1      Albumin 4.3      Bilirubin Total 0.5     CBC WITH PLATELETS AND DIFFERENTIAL - Abnormal    WBC Count 4.8      RBC Count 4.71      Hemoglobin 13.1 (*)     Hematocrit 40.5      MCV 86      MCH 27.8      MCHC 32.3      RDW 13.2      Platelet Count 144 (*)     % Neutrophils 71      % Lymphocytes 22      % Monocytes 6      % Eosinophils 1      % Basophils 0      % Immature Granulocytes 0      NRBCs per 100 WBC 0      Absolute Neutrophils 3.4      Absolute Lymphocytes 1.0      Absolute Monocytes 0.3      Absolute Eosinophils 0.1      Absolute Basophils 0.0      Absolute Immature Granulocytes 0.0      Absolute NRBCs 0.0     TROPONIN T, HIGH SENSITIVITY - Normal    Troponin T, High Sensitivity 7     INR - Normal    INR 1.02     TROPONIN T, HIGH SENSITIVITY - Normal    Troponin T, High Sensitivity 6         RADIOLOGY:  MRA Neck (Carotids) wo & w Contrast   Final Result   IMPRESSION:   HEAD MRI:    1.  No acute or subacute ischemic change.   2.  No acute intracranial process or abnormal enhancement.   3.  Chronic changes as above.      HEAD MRA:    1.  No large vessel occlusion, high flow AVM or severe stenosis identified.   2.  Mild and moderate stenoses as above.   3.  Irregularity along the lateral right cavernous internal carotid artery throughout for artifact or small poorly characterized aneurysm.      NECK MRA:   1.  Atherosclerotic plaque results in at least 70% stenosis of the left internal carotid artery.   2.  High-grade stenosis of the origin  of the left external carotid artery.   3.  Irregularity of the bilateral vertebral arteries is indeterminate for artifact or potential fibromuscular dysplasia.      MRA Brain ([place]) wo Contrast   Final Result   IMPRESSION:   HEAD MRI:    1.  No acute or subacute ischemic change.   2.  No acute intracranial process or abnormal enhancement.   3.  Chronic changes as above.      HEAD MRA:    1.  No large vessel occlusion, high flow AVM or severe stenosis identified.   2.  Mild and moderate stenoses as above.   3.  Irregularity along the lateral right cavernous internal carotid artery throughout for artifact or small poorly characterized aneurysm.      NECK MRA:   1.  Atherosclerotic plaque results in at least 70% stenosis of the left internal carotid artery.   2.  High-grade stenosis of the origin of the left external carotid artery.   3.  Irregularity of the bilateral vertebral arteries is indeterminate for artifact or potential fibromuscular dysplasia.      MR Brain w/o & w Contrast   Final Result   IMPRESSION:   HEAD MRI:    1.  No acute or subacute ischemic change.   2.  No acute intracranial process or abnormal enhancement.   3.  Chronic changes as above.      HEAD MRA:    1.  No large vessel occlusion, high flow AVM or severe stenosis identified.   2.  Mild and moderate stenoses as above.   3.  Irregularity along the lateral right cavernous internal carotid artery throughout for artifact or small poorly characterized aneurysm.      NECK MRA:   1.  Atherosclerotic plaque results in at least 70% stenosis of the left internal carotid artery.   2.  High-grade stenosis of the origin of the left external carotid artery.   3.  Irregularity of the bilateral vertebral arteries is indeterminate for artifact or potential fibromuscular dysplasia.          EKG:   Performed at: 5:04 PM  Impression: Sinus rhythm  Rate: 67 bpm  Rhythm: sinus  QRS Interval: 96 ms  QTc Interval: 437 ms  Comparison: 3/4/15, T wave  inversion no longer evident in inferior leads.  I have independently reviewed and interpreted the EKG(s) documented above.     PROCEDURES:   Procedures     I, Mary Grace Rivera, am serving as a scribe to document services personally performed by Dr. Xenia Dinero based on my observation and the provider's statements to me. I, Xenia Dinero MD attest that Mary Grace Rivera is acting in a scribe capacity, has observed my performance of the services and has documented them in accordance with my direction.    Xenia Dinero M.D.  Emergency Medicine  [facility] EMERGENCY ROOM  [address] [facility] [address]  [phone]  Dept: [phone]       Xenia Dinero MD  01/06/24 6764

## 2024-01-07 NOTE — DISCHARGE INSTRUCTIONS
You can take meclizine as needed for dizziness  You did have some plaque/narrowing in the vessels of your neck and would like you to follow-up with vascular surgery in clinic regarding this  Is also follow-up closely with your primary care doctor  Return with significant worsening dizziness, chest pain, numbness, weakness, unable to keep down fluids or other concerns

## 2024-01-07 NOTE — CONSULTS
"[facility]    Stroke Telephone Note    I was called by Xenia Dinero on 01/06/24 regarding patient Darren Mcconnell. The patient is a 68 year old male with HTN, CAD, who presents with dizziness, diaphoresis, and nausea.  This resolved with meclizine.  MRI/MRA negative for acute findings but shows multifocal atherosclerotic disease predominantly in the anterior circualtion.    Vitals  BP: (!) 146/67   Pulse: 77   Resp: 16   Temp: 97.9  F (36.6  C)   Weight: 73 kg (161 lb)    Imaging Findings  MRI as above    Impression  Vertigo--likely BPPV.  MRI negative for stroke  Atherosclerotic disease    Recommendations  Patient is already on aspirin, Atorvastatin 80mg, and antihypertensives so risk factors for management of asymptomatmic atherosclerotic disease appear to be managed   --BP goal < 130/80, with tighter control associated with lower overall CV risk, if tolerated   --LDL < 70  --A1c  <7.0  Consider referral to Vascular Surgery for consultation and follow-up of carotid stenosis    My recommendations are based on the information provided over the phone by Darren Mcconnell's in-person providers. They are not intended to replace the clinical judgment of his in-person providers. I was not requested to personally see or examine the patient at this time.     Sudheer Paris MD, MS  Vascular Neurology    To page me or covering stroke neurology team member, click here: [facility]  Choose \"On Call\" tab at top, then select \"NEUROLOGY/ALL SITES\" from middle drop-down box, press Enter, then look for \"stroke\" or \"telestroke\" for your site.    I personally spent a total of 25 minutes on January 20, 2024 consulting with his  medical providers and coordinating care.  This includes personally reviewing the patient's medical record including diagnostic testing, neuroimaging, and laboratory studies.          "

## 2024-01-10 DIAGNOSIS — I65.29 CAROTID STENOSIS: Primary | ICD-10-CM
